# Patient Record
Sex: FEMALE | Race: WHITE | Employment: FULL TIME | URBAN - METROPOLITAN AREA
[De-identification: names, ages, dates, MRNs, and addresses within clinical notes are randomized per-mention and may not be internally consistent; named-entity substitution may affect disease eponyms.]

---

## 2020-09-09 ENCOUNTER — HOSPITAL ENCOUNTER (OUTPATIENT)
Dept: MRI IMAGING | Age: 35
Discharge: HOME | End: 2020-09-09
Attending: ORTHOPAEDIC SURGERY
Payer: COMMERCIAL

## 2020-09-09 DIAGNOSIS — M54.12 CERVICAL RADICULOPATHY: ICD-10-CM

## 2020-09-09 DIAGNOSIS — M25.511 PAIN OF RIGHT SHOULDER REGION: ICD-10-CM

## 2020-09-09 PROCEDURE — 72141 MRI NECK SPINE W/O DYE: CPT

## 2021-04-23 ENCOUNTER — VIRTUAL VISIT (OUTPATIENT)
Dept: FAMILY MEDICINE CLINIC | Age: 36
End: 2021-04-23
Payer: COMMERCIAL

## 2021-04-23 DIAGNOSIS — Z76.89 ENCOUNTER TO ESTABLISH CARE WITH NEW DOCTOR: Primary | ICD-10-CM

## 2021-04-23 DIAGNOSIS — R63.5 WEIGHT GAIN: ICD-10-CM

## 2021-04-23 PROCEDURE — 99203 OFFICE O/P NEW LOW 30 MIN: CPT | Performed by: FAMILY MEDICINE

## 2021-04-23 RX ORDER — QUETIAPINE FUMARATE 400 MG/1
400 TABLET, FILM COATED ORAL 2 TIMES DAILY
COMMUNITY

## 2021-04-23 NOTE — PROGRESS NOTES
Rosario Mayorga, who was evaluated through a synchronous (real-time) audio-video encounter, and/or her healthcare decision maker, is aware that it is a billable service, with coverage as determined by her insurance carrier. She provided verbal consent to proceed: YES, and patient identification was verified. It was conducted pursuant to the emergency declaration under the [facility], [facility] authority and the [employer] and [employer] Act. A caregiver was present when appropriate. Ability to conduct physical exam was limited. I was at home. The patient was at home. This virtual visit was conducted via Do[employer]. Pursuant to the emergency declaration under the [facility], [address] [place] [address] authority and the [employer] and [employer] Act, this Virtual  Visit was conducted to reduce the patient's risk of exposure to COVID-19 and provide continuity of care for an established patient. Services were provided through a video synchronous discussion virtually to substitute for in-person clinic visit. Due to this being a TeleHealth evaluation, many elements of the physical examination are unable to be assessed. Total Time: minutes: 5-10 minutes. Lolly Acharya MD    [phone]  Subjective:   Rosario Mayorga was seen for: Here to establish care with new PCP. Krzysztof Walters Reports gaining weight for the past 6 months. Is vegan and works out often. Has been on Seroquel for 10 years (managed by psychiatry). Had some labs done with her OBGYN in January and was told all was normal but she is not sure what was tested. Prior to Admission medications    Medication Sig Start Date End Date Taking? Authorizing Provider   QUEtiapine (SEROqueL) 400 mg tablet Take 400 mg by mouth two (2) times a day. Yes Provider, Historical   ALPRAZolam (Xanax) 0.5 mg tablet Take 0.5 mg by mouth two (2) times a day. Yes Provider, Historical   venlafaxine (EFFEXOR) 100 mg tablet Take 100 mg by mouth three (3) times daily. 4/23/21  Provider, Historical   zolpidem (AMBIEN) 10 mg tablet Take  by mouth nightly as needed. 4/23/21  Provider, Historical   Levocetirizine (XYZAL) 5 mg tablet Take 5 mg by mouth daily as needed. 5/25/11 4/23/21  Annette Babin MD   predniSONE (DELTASONE) 10 mg tablet Take 1 Tab by mouth. Take 3 tabs daily x 3 days; then two tabs daily x 3 days; then one tab daily x 3 days 5/25/11 4/23/21  Tino LAGOS MD       Allergies   Allergen Reactions    Sulfa (Sulfonamide Antibiotics) Hives       Review of Systems   Constitutional: Negative for chills, fever, malaise/fatigue and weight loss. HENT: Negative for hearing loss, nosebleeds and sore throat. Respiratory: Negative for cough, hemoptysis, sputum production, shortness of breath and wheezing. Cardiovascular: Negative for chest pain, palpitations, leg swelling and PND. Gastrointestinal: Negative for abdominal pain, blood in stool, constipation, diarrhea, nausea and vomiting. Genitourinary: Negative for dysuria, frequency and urgency. Musculoskeletal: Negative for back pain, falls, joint pain, myalgias and neck pain. Skin: Negative for itching and rash. Neurological: Negative for dizziness, sensory change, focal weakness and loss of consciousness. Psychiatric/Behavioral: Negative for depression. The patient is not nervous/anxious. All other systems reviewed and are negative.         Past Medical History:   Diagnosis Date    ADD (attention deficit disorder)     ABRAHAM (generalized anxiety disorder)        Past Surgical History:   Procedure Laterality Date    COLONOSCOPY      HX ORTHOPAEDIC      broke leg       Family History   Problem Relation Age of Onset    Hypertension Father     Psychiatric Disorder Brother     Cancer Maternal Grandmother     Diabetes Maternal Grandmother     Lung Disease Maternal Grandmother     Psychiatric Disorder Maternal Grandmother        Social History     Socioeconomic History    Marital status: SINGLE     Spouse name: Not on file    Number of children: Not on file    Years of education: Not on file    Highest education level: Not on file   Occupational History    Not on file   Social Needs    Financial resource strain: Not on file    Food insecurity     Worry: Not on file     Inability: Not on file    Transportation needs     Medical: Not on file     Non-medical: Not on file   Tobacco Use    Smoking status: Never Smoker    Smokeless tobacco: Never Used   Substance and Sexual Activity    Alcohol use: Yes     Frequency: Monthly or less     Comment: once per week    Drug use: No    Sexual activity: Yes     Partners: Male     Birth control/protection: Pill   Lifestyle    Physical activity     Days per week: Not on file     Minutes per session: Not on file    Stress: Not on file   Relationships    Social connections     Talks on phone: Not on file     Gets together: Not on file     Attends [employer] service: Not on file     Active member of club or organization: Not on file     Attends meetings of clubs or organizations: Not on file     Relationship status: Not on file    Intimate partner violence     Fear of current or ex partner: Not on file     Emotionally abused: Not on file     Physically abused: Not on file     Forced sexual activity: Not on file   Other Topics Concern    Not on file   Social History Narrative    Not on file         Physical Exam:     There were no vitals taken for this visit.      General: alert, cooperative, no distress   Mental  status: normal mood, behavior, speech, dress, motor activity, and thought processes, able to follow commands   HENT: NCAT   Neck: no visualized mass   Resp: no respiratory distress   Neuro: no gross deficits   Skin: no discoloration or lesions of concern on visible areas   Psychiatric: normal affect, consistent with stated mood, no evidence of hallucinations       Assessment & Plan:     Cooper Meza is a 28 y.o. female who presents today for:    1. Encounter to establish care with new doctor    2. Weight gain  Suspect side effect from Seroquel. Pt to obtain prior labs and fax over to our clinic for me to review. Medications Discontinued During This Encounter   Medication Reason    Levocetirizine (XYZAL) 5 mg tablet LIST CLEANUP    predniSONE (DELTASONE) 10 mg tablet LIST CLEANUP    venlafaxine (EFFEXOR) 100 mg tablet LIST CLEANUP    zolpidem (AMBIEN) 10 mg tablet LIST CLEANUP           Treatment risks/benefits/costs/interactions/alternatives discussed with patient. Advised patient to call back or return to office if symptoms worsen/change/persist. If patient cannot reach us or should anything more severe/urgent arise he/she should proceed directly to the nearest emergency department. Discussed expected course/resolution/complications of diagnosis in detail with patient. Patient expressed understanding with the diagnosis and plan. Layne Lala M.D.

## 2023-02-15 ENCOUNTER — OFFICE VISIT (OUTPATIENT)
Dept: INTERNAL MEDICINE CLINIC | Age: 38
End: 2023-02-15

## 2023-02-15 VITALS
WEIGHT: 164 LBS | HEART RATE: 78 BPM | RESPIRATION RATE: 18 BRPM | DIASTOLIC BLOOD PRESSURE: 78 MMHG | TEMPERATURE: 97.7 F | HEIGHT: 65 IN | BODY MASS INDEX: 27.32 KG/M2 | SYSTOLIC BLOOD PRESSURE: 102 MMHG | OXYGEN SATURATION: 97 %

## 2023-02-15 DIAGNOSIS — E66.3 OVERWEIGHT (BMI 25.0-29.9): Primary | ICD-10-CM

## 2023-02-15 DIAGNOSIS — E03.9 ACQUIRED HYPOTHYROIDISM: ICD-10-CM

## 2023-02-15 DIAGNOSIS — R19.6 HALITOSIS: ICD-10-CM

## 2023-02-15 DIAGNOSIS — F41.9 ANXIETY: ICD-10-CM

## 2023-02-15 PROCEDURE — 99204 OFFICE O/P NEW MOD 45 MIN: CPT | Performed by: STUDENT IN AN ORGANIZED HEALTH CARE EDUCATION/TRAINING PROGRAM

## 2023-02-15 RX ORDER — LEVOTHYROXINE AND LIOTHYRONINE 38; 9 UG/1; UG/1
1 TABLET ORAL DAILY
COMMUNITY

## 2023-02-15 NOTE — PROGRESS NOTES
[facility]   Internal Medicine  [address] [facility] [address], [address] [facility]  [address]  [place], [address] [facility]  [phone]      Primary Care Visit Note    Assessment/Plan:     Diagnoses and all orders for this visit:    Acquired hypothyroidism - asx. increased 2 weeks ago by gynecologist. Will continue current dose and check TFTs in ~4 weeks. Will plan to switch to levothyroxine from armour thyroid. Halitosis - no oral culprit found by dentistry. Will r/o diverticulum with barium swallow. Overweight (BMI 44.3-69.2)  -     METABOLIC PANEL, COMPREHENSIVE; Future  -     HEMOGLOBIN A1C WITH EAG; Future  -     CBC WITH AUTOMATED DIFF; Future  -     LIPID PANEL; Future    Anxiety - managed by psychiatry w xanax bid and seroquel qhs      Follow up: Follow-up and Dispositions    Return in about 3 months (around 5/15/2023). Andreea Nieto MD    CC:     Chief Complaint   Patient presents with    [facility]       HPI:     Alyse Mccallum is a 40 y.o. female who presents to establish care. Thyroid - she started on amour thyroid at 60 mcg why was rxd by ob-gyn. She has had wt gain but has not noticed any changes otherwise. FT4 - 0.68, TSH is 2.88 on 1/23 at which time dose was doubled to 60 mcg.   - no constipation, no change in hair or nails, no depression,     Halitosis - she has noticed it over the past 6 months. Dentist has checked gums and teeth and seem ok.      Pt is taking xanax twice daily from her psych - has tried prozac and paxil in the past.       ROS:   Constitutional: negative for fevers, chills, anorexia and weight loss  Eyes:   negative for visual disturbance and irritation  ENT:   negative for tinnitus,sore throat,nasal congestion,ear pain,hoarseness  Respiratory:  negative for cough, hemoptysis, dyspnea,wheezing  CV:   negative for chest pain, palpitations, lower extremity edema  GI:   negative for nausea, vomiting, diarrhea, abdominal pain,melena  Genitourinary: negative for frequency, dysuria and hematuria  Musculoskel: negative for myalgias, arthralgias, back pain, muscle weakness, joint pain  Neurological:  negative for headaches, dizziness, focal weakness, numbness  Psychiatric:     Negative for depression or anxiety        Past Medical History: Active Ambulatory Problems     Diagnosis Date Noted    ADD (attention deficit disorder)     ABRAHAM (generalized anxiety disorder)      Resolved Ambulatory Problems     Diagnosis Date Noted    No Resolved Ambulatory Problems     No Additional Past Medical History          Current Medications:     Current Outpatient Medications:     thyroid, Pork, (ARMOUR) 60 mg tablet, Take 1 Tablet by mouth daily. , Disp: , Rfl:     QUEtiapine (SEROquel) 400 mg tablet, Take 400 mg by mouth two (2) times a day., Disp: , Rfl:     ALPRAZolam (XANAX) 0.5 mg tablet, Take 0.5 mg by mouth two (2) times a day., Disp: , Rfl:       Past Surgical History:     Past Surgical History:   Procedure Laterality Date    COLONOSCOPY      HX ORTHOPAEDIC      broke leg         Family History:     Family History   Problem Relation Age of Onset    Hypertension Father     Psychiatric Disorder Brother     Cancer Maternal Grandmother     Diabetes Maternal Grandmother     Lung Disease Maternal Grandmother     Psychiatric Disorder Maternal Grandmother          Social History:     Social History     Socioeconomic History    Marital status: SINGLE     Spouse name: Not on file    Number of children: Not on file    Years of education: Not on file    Highest education level: Not on file   Occupational History    Not on file   Tobacco Use    Smoking status: Never    Smokeless tobacco: Never   Vaping Use    Vaping Use: Never used   Substance and Sexual Activity    Alcohol use: Yes     Comment: once per week    Drug use: No    Sexual activity: Yes     Partners: Male     Birth control/protection: Pill   Other Topics Concern    Not on file   Social History Narrative    Not on file     Social Determinants of Health     Financial Resource Strain: Low Risk     Difficulty of Paying Living Expenses: Not hard at all   Food Insecurity: No Food Insecurity    Worried About Running Out of Food in the Last Year: Never true    Ran Out of Food in the Last Year: Never true   Transportation Needs: Not on file   Physical Activity: Not on file   Stress: Not on file   Social Connections: Not on file   Intimate Partner Violence: Not on file   Housing Stability: Not on file            Visit Vitals  /78 (BP 1 Location: Left upper arm, BP Patient Position: Sitting, BP Cuff Size: Adult)   Pulse 78   Temp 97.7 °F (36.5 °C) (Temporal)   Resp 18   Ht 5' 5\" (1.651 m)   Wt 164 lb (74.4 kg)   LMP 01/17/2023   SpO2 97%   BMI 27.29 kg/m²       Physical Exam:   General - Well appearing female  HEENT - PERRL, TM no erythema/opacification, normal nasal turbinates, no oropharyngeal erythema or exudate, MMM  Neck - supple, no thyroidomegaly, no lymphadenopathy  Pulm - clear to auscultation bilaterally  Cardio - RRR, normal S1 S2, no murmur  Abd - soft, nontender, no masses, no HSM  Extrem - no edema, +2 distal pulses  Neuro-  Alert and oriented, No focal deficits           Labs/Imaging:

## 2023-02-15 NOTE — PROGRESS NOTES
Chief Complaint   Patient presents with    Establish Care          1. \"Have you been to the ER, urgent care clinic since your last visit? Hospitalized since your last visit? Yes, urgent care, [facility], flu, 12/25/22. 2. \"Have you seen or consulted any other health care providers outside of the [address] [place] [address] since your last visit? Yes    3. For patients aged 39-70: Has the patient had a colonoscopy / FIT/ Cologuard? NA - based on age      If the patient is female:    4. For patients aged 41-77: Has the patient had a mammogram within the past 2 years? NA - based on age or sex      11. For patients aged 21-65: Has the patient had a pap smear?  Yes - no [place] present, Dr Shay Park, 10/01/22

## 2023-02-17 ENCOUNTER — DOCUMENTATION ONLY (OUTPATIENT)
Dept: INTERNAL MEDICINE CLINIC | Age: 38
End: 2023-02-17

## 2023-02-22 ENCOUNTER — HOSPITAL ENCOUNTER (OUTPATIENT)
Dept: GENERAL RADIOLOGY | Age: 38
Discharge: HOME | End: 2023-02-22
Attending: STUDENT IN AN ORGANIZED HEALTH CARE EDUCATION/TRAINING PROGRAM
Payer: COMMERCIAL

## 2023-02-22 DIAGNOSIS — R19.6 HALITOSIS: ICD-10-CM

## 2023-02-22 PROCEDURE — 74220 X-RAY XM ESOPHAGUS 1CNTRST: CPT

## 2023-02-23 ENCOUNTER — TELEPHONE (OUTPATIENT)
Dept: INTERNAL MEDICINE CLINIC | Age: 38
End: 2023-02-23

## 2023-02-23 DIAGNOSIS — R19.6 HALITOSIS: Primary | ICD-10-CM

## 2023-02-23 NOTE — PROGRESS NOTES
There were no abnormalities found on your swallow study to suggest any cause of the bad breath. We can discuss further at your next appointment, otherwise if you feel it needs to be addressed sooner please call to schedule a separate follow-up appointment.

## 2023-05-18 ENCOUNTER — OFFICE VISIT (OUTPATIENT)
Age: 38
End: 2023-05-18
Payer: COMMERCIAL

## 2023-05-18 VITALS
HEART RATE: 63 BPM | OXYGEN SATURATION: 99 % | BODY MASS INDEX: 28.19 KG/M2 | SYSTOLIC BLOOD PRESSURE: 94 MMHG | RESPIRATION RATE: 16 BRPM | HEIGHT: 65 IN | DIASTOLIC BLOOD PRESSURE: 60 MMHG | TEMPERATURE: 98.1 F | WEIGHT: 169.2 LBS

## 2023-05-18 DIAGNOSIS — R53.83 OTHER FATIGUE: ICD-10-CM

## 2023-05-18 DIAGNOSIS — E03.9 HYPOTHYROIDISM, UNSPECIFIED TYPE: Primary | ICD-10-CM

## 2023-05-18 PROCEDURE — G8419 CALC BMI OUT NRM PARAM NOF/U: HCPCS | Performed by: STUDENT IN AN ORGANIZED HEALTH CARE EDUCATION/TRAINING PROGRAM

## 2023-05-18 PROCEDURE — G8427 DOCREV CUR MEDS BY ELIG CLIN: HCPCS | Performed by: STUDENT IN AN ORGANIZED HEALTH CARE EDUCATION/TRAINING PROGRAM

## 2023-05-18 PROCEDURE — 1036F TOBACCO NON-USER: CPT | Performed by: STUDENT IN AN ORGANIZED HEALTH CARE EDUCATION/TRAINING PROGRAM

## 2023-05-18 PROCEDURE — 99213 OFFICE O/P EST LOW 20 MIN: CPT | Performed by: STUDENT IN AN ORGANIZED HEALTH CARE EDUCATION/TRAINING PROGRAM

## 2023-05-18 RX ORDER — LEVOTHYROXINE SODIUM 112 UG/1
112 TABLET ORAL DAILY
Qty: 30 TABLET | Refills: 3 | Status: SHIPPED | OUTPATIENT
Start: 2023-05-18

## 2023-05-18 SDOH — ECONOMIC STABILITY: FOOD INSECURITY: WITHIN THE PAST 12 MONTHS, YOU WORRIED THAT YOUR FOOD WOULD RUN OUT BEFORE YOU GOT MONEY TO BUY MORE.: NEVER TRUE

## 2023-05-18 SDOH — ECONOMIC STABILITY: INCOME INSECURITY: HOW HARD IS IT FOR YOU TO PAY FOR THE VERY BASICS LIKE FOOD, HOUSING, MEDICAL CARE, AND HEATING?: NOT HARD AT ALL

## 2023-05-18 SDOH — ECONOMIC STABILITY: HOUSING INSECURITY
IN THE LAST 12 MONTHS, WAS THERE A TIME WHEN YOU DID NOT HAVE A STEADY PLACE TO SLEEP OR SLEPT IN A SHELTER (INCLUDING NOW)?: NO

## 2023-05-18 SDOH — ECONOMIC STABILITY: FOOD INSECURITY: WITHIN THE PAST 12 MONTHS, THE FOOD YOU BOUGHT JUST DIDN'T LAST AND YOU DIDN'T HAVE MONEY TO GET MORE.: NEVER TRUE

## 2023-05-18 NOTE — PROGRESS NOTES
1. \"Have you been to the ER, urgent care clinic since your last visit? Hospitalized since your last visit? \" No    2. \"Have you seen or consulted any other health care providers outside of the [address] [place] [address] since your last visit? \" No     3. For patients aged 39-70: Has the patient had a colonoscopy / FIT/ Cologuard? NA - based on age      If the patient is female:    4. For patients aged 41-77: Has the patient had a mammogram within the past 2 years? NA - based on age or sex      11. For patients aged 21-65: Has the patient had a pap smear?  No
Position: Sitting)   Pulse 63   Temp 98.1 °F (36.7 °C) (Temporal)   Resp 16   Ht 5' 5\" (1.651 m)   Wt 169 lb 3.2 oz (76.7 kg)   LMP 04/20/2023 (Approximate)   SpO2 99%   BMI 28.16 kg/m²     Physical Exam:   General - Well appearing  HEENT - EOMI, non-icteric sclera. Pulm - clear to auscultation bilaterally  Cardio - RRR, normal S1 S2, no murmur  Abd - soft, nontender, nabs  Extrem - no edema  Neuro-  Alert and oriented, No focal deficits      Labs/Imaging:     Labs and imaging reviewed by me and significant for:    Lab Results   Component Value Date    TSH 2.27 04/17/2023     Lab Results   Component Value Date     04/17/2023    K 3.4 (L) 04/17/2023     04/17/2023    CO2 22 04/17/2023    BUN 7 04/17/2023    CREATININE 1.07 (H) 04/17/2023    GLUCOSE 104 (H) 04/17/2023    CALCIUM 8.9 04/17/2023    PROT 7.1 04/17/2023    LABALBU 4.3 04/17/2023    BILITOT 0.2 04/17/2023    ALKPHOS 89 04/17/2023    AST 21 04/17/2023    ALT 22 04/17/2023    AGRATIO 1.5 04/17/2023    GLOB 2.8 04/17/2023       Hemoglobin A1C   Date Value Ref Range Status   04/17/2023 6.1 (H) 4.0 - 5.6 % Final     Comment:     NEW METHOD  PLEASE NOTE NEW REFERENCE RANGE  (NOTE)  HbA1C Interpretive Ranges  <5.7              Normal  5.7 - 6.4         Consider Prediabetes  >6.5              Consider Diabetes       Lab Results   Component Value Date    WBC 7.7 04/17/2023    HGB 12.9 04/17/2023    HCT 39.0 04/17/2023    MCV 94.4 04/17/2023     04/17/2023           Please note that this dictation was completed in part with [employer], the [employer] voice recognition software. Quite often unanticipated grammatical, syntax, homophones, and other interpretive errors are inadvertently transcribed by the computer software. Please disregard these errors. Please excuse any errors that have escaped final proofreading.

## 2023-06-06 DIAGNOSIS — E03.9 HYPOTHYROIDISM, UNSPECIFIED TYPE: ICD-10-CM

## 2023-06-06 RX ORDER — LEVOTHYROXINE SODIUM 112 UG/1
TABLET ORAL
Qty: 30 TABLET | Refills: 3 | Status: SHIPPED | OUTPATIENT
Start: 2023-06-06

## 2024-09-03 ENCOUNTER — TELEPHONE (OUTPATIENT)
Age: 39
End: 2024-09-03

## 2024-09-03 NOTE — TELEPHONE ENCOUNTER
Pt states that she has a double ear infection that she gets 3-4 times a year.    Pt needs to know who to see for ENT?  Please call as soon as possible.  Pt could not make 8:15 appt that was offered.     Pt not seen in our office since 5-2023